# Patient Record
Sex: FEMALE | Race: BLACK OR AFRICAN AMERICAN | NOT HISPANIC OR LATINO | Employment: UNEMPLOYED | URBAN - METROPOLITAN AREA
[De-identification: names, ages, dates, MRNs, and addresses within clinical notes are randomized per-mention and may not be internally consistent; named-entity substitution may affect disease eponyms.]

---

## 2017-01-05 ENCOUNTER — OFFICE VISIT (OUTPATIENT)
Dept: OPHTHALMOLOGY | Facility: CLINIC | Age: 67
End: 2017-01-05

## 2017-01-05 DIAGNOSIS — H25.13 SENILE NUCLEAR SCLEROSIS, BILATERAL: Primary | ICD-10-CM

## 2017-01-05 DIAGNOSIS — H35.033 HYPERTENSIVE RETINOPATHY, BILATERAL: ICD-10-CM

## 2017-01-05 ASSESSMENT — VISUAL ACUITY
OD_SC+: -3
OS_SC: 20/20
OS_SC+: -1
METHOD: SNELLEN - LINEAR
OD_SC: 20/20

## 2017-01-05 ASSESSMENT — CUP TO DISC RATIO
OD_RATIO: 0.2
OS_RATIO: 0.2

## 2017-01-05 ASSESSMENT — CONF VISUAL FIELD
OD_NORMAL: 1
OS_NORMAL: 1

## 2017-01-05 ASSESSMENT — EXTERNAL EXAM - LEFT EYE: OS_EXAM: NORMAL

## 2017-01-05 ASSESSMENT — TONOMETRY
OS_IOP_MMHG: 16
OD_IOP_MMHG: 18
IOP_METHOD: ICARE

## 2017-01-05 ASSESSMENT — EXTERNAL EXAM - RIGHT EYE: OD_EXAM: NORMAL

## 2017-01-05 NOTE — MR AVS SNAPSHOT
After Visit Summary   2017    Caitlyn Langston    MRN: [id number]           Patient Information     Date Of Birth          1950        Visit Information        Provider Department      2017 10:40 AM Merlin Schwarz OD [facility]        Today's Diagnoses     Senile nuclear sclerosis, bilateral    -  1     Hypertensive retinopathy, bilateral            Follow-ups after your visit        Follow-up notes from your care team     Return in about 11 months (around 2017) for Annual Visit.      Who to contact     Please call your clinic at [phone] to:    Ask questions about your health    Make or cancel appointments    Discuss your medicines    Learn about your test results    Speak to your doctor   If you have compliments or concerns about an experience at your clinic, or if you wish to file a complaint, please contact [facility] Patient Relations at [phone] or email us at [email or web address]         Additional Information About Your Visit        MyChart Information     [employer] is an electronic gateway that provides easy, online access to your medical records. With [employer], you can request a clinic appointment, read your test results, renew a prescription or communicate with your care team.     To sign up for [employer] visit the website at [email or web address]   You will be asked to enter the access code listed below, as well as some personal information. Please follow the directions to create your username and password.     Your access code is: [email or web address]  Expires: 2017 11:29 AM     Your access code will  in 90 days. If you need help or a new code, please contact your [facility] or call [phone] for assistance.        Care EveryWhere ID     This is your Care EveryWhere ID. This could be used by other organizations to access your [facility]  records  [id number]         Blood Pressure from Last 3 Encounters:   No data found for BP    Weight from Last 3 Encounters:   No data found for Wt              Today, you had the following     No orders found for display       Primary Care Provider    None Specified       No primary provider on file.        Thank you!     Thank you for choosing [facility]  for your care. Our goal is always to provide you with excellent care. Hearing back from our patients is one way we can continue to improve our services. Please take a few minutes to complete the written survey that you may receive in the mail after your visit with us. Thank you!             Your Updated Medication List - Protect others around you: Learn how to safely use, store and throw away your medicines at www.disposemymeds.org.          This list is accurate as of: 1/5/17 11:29 AM.  Always use your most recent med list.                   Brand Name Dispense Instructions for use    LISINOPRIL PO          loratadine 10 MG ODT tab    CLARITIN REDITABS     Take 10 mg by mouth daily       VITAMIN D3 PO      Take by mouth daily

## 2017-01-05 NOTE — PROGRESS NOTES
Assessment/Plan  1. Nuclear sclerosis OU   Educated patient on condition. Not visually significant at this time. Continue to monitor annually.  2. Hypertensive retinopathy OU   Educated patient on clinical findings. Return to clinic in 1 year for dilated exam, or sooner, as needed.  3. Presbyopia OU   Not assessed at this visit. Monitor annually.

## 2018-01-04 ENCOUNTER — OFFICE VISIT (OUTPATIENT)
Dept: OPHTHALMOLOGY | Facility: CLINIC | Age: 68
End: 2018-01-04
Payer: COMMERCIAL

## 2018-01-04 DIAGNOSIS — H02.889 MEIBOMIAN GLAND DYSFUNCTION: ICD-10-CM

## 2018-01-04 DIAGNOSIS — H25.13 SENILE NUCLEAR SCLEROSIS, BILATERAL: ICD-10-CM

## 2018-01-04 DIAGNOSIS — H35.033 HYPERTENSIVE RETINOPATHY, BILATERAL: Primary | ICD-10-CM

## 2018-01-04 DIAGNOSIS — H52.4 PRESBYOPIA: ICD-10-CM

## 2018-01-04 DIAGNOSIS — D31.01 NEVUS OF RIGHT CONJUNCTIVA: ICD-10-CM

## 2018-01-04 ASSESSMENT — VISUAL ACUITY
OS_SC: 20/20
OD_SC+: -3
METHOD: SNELLEN - LINEAR
OS_SC+: -3
OD_SC: 20/20

## 2018-01-04 ASSESSMENT — TONOMETRY
IOP_METHOD: ICARE
OS_IOP_MMHG: 18
OD_IOP_MMHG: 19

## 2018-01-04 ASSESSMENT — CUP TO DISC RATIO
OS_RATIO: 0.2
OD_RATIO: 0.2

## 2018-01-04 ASSESSMENT — CONF VISUAL FIELD
OD_NORMAL: 1
OS_NORMAL: 1

## 2018-01-04 ASSESSMENT — EXTERNAL EXAM - LEFT EYE: OS_EXAM: NORMAL

## 2018-01-04 ASSESSMENT — EXTERNAL EXAM - RIGHT EYE: OD_EXAM: NORMAL

## 2018-01-04 NOTE — MR AVS SNAPSHOT
After Visit Summary   2018    Caitlyn Langston    MRN: [id number]           Patient Information     Date Of Birth          1950        Visit Information        Provider Department      2018 10:30 AM Merlin Schwarz OD [facility]        Today's Diagnoses     Hypertensive retinopathy, bilateral    -  1    Senile nuclear sclerosis, bilateral        Meibomian gland dysfunction        Nevus of right conjunctiva        Presbyopia           Follow-ups after your visit        Follow-up notes from your care team     Return in about 1 year (around 2019) for Comprehensive Eye Exam.      Who to contact     Please call your clinic at [phone] to:    Ask questions about your health    Make or cancel appointments    Discuss your medicines    Learn about your test results    Speak to your doctor   If you have compliments or concerns about an experience at your clinic, or if you wish to file a complaint, please contact [facility] Patient Relations at [phone] or email us at [email or web address]         Additional Information About Your Visit        MyChart Information     [employer] is an electronic gateway that provides easy, online access to your medical records. With [employer], you can request a clinic appointment, read your test results, renew a prescription or communicate with your care team.     To sign up for [employer] visit the website at [email or web address]   You will be asked to enter the access code listed below, as well as some personal information. Please follow the directions to create your username and password.     Your access code is: [email or web address]  Expires: 2018  6:31 AM     Your access code will  in 90 days. If you need help or a new code, please contact your [facility] or call [phone] for assistance.        Care EveryWhere ID     This is your Care EveryWhere  ID. This could be used by other organizations to access your [facility] medical records  [id number]         Blood Pressure from Last 3 Encounters:   No data found for BP    Weight from Last 3 Encounters:   No data found for Wt              Today, you had the following     No orders found for display       Primary Care Provider Office Phone # Fax #    Maritza Mary Alice Ramos [phone]       [facility] [address]  [facility] [address]        Equal Access to Services     [facility] : Hadii aad ku hadasho Soomaali, waaxda luqadaha, qaybta kaalmada adeegyada, waxay idiin hayaan adeeg kharash la'aan . So [facility] [phone].    ATENCIÓN: Si habla español, tiene a hooper disposición servicios gratuitos de asistencia lingüística. [facility] [phone].    We comply with applicable federal civil rights laws and [place] laws. We do not discriminate on the basis of race, color, national origin, age, disability, sex, sexual orientation, or gender identity.            Thank you!     Thank you for choosing [facility]  for your care. Our goal is always to provide you with excellent care. Hearing back from our patients is one way we can continue to improve our services. Please take a few minutes to complete the written survey that you may receive in the mail after your visit with us. Thank you!             Your Updated Medication List - Protect others around you: Learn how to safely use, store and throw away your medicines at www.disposemymeds.org.          This list is accurate as of: 1/4/18 11:50 AM.  Always use your most recent med list.                   Brand Name Dispense Instructions for use Diagnosis    LISINOPRIL PO           loratadine 10 MG ODT tab    CLARITIN REDITABS     Take 10 mg by mouth daily        VITAMIN D3 PO      Take by mouth daily

## 2018-01-04 NOTE — PROGRESS NOTES
Assessment/Plan  (H35.033) Hypertensive retinopathy, bilateral  (primary encounter diagnosis)  Comment: Mild crossing changes OU  Plan:  Educated patient on clinical findings and the importance of continued management with primary care physician. Continue management as directed and return to clinic in 1 year for dilated exam, or sooner, as needed.    (H25.13) Senile nuclear sclerosis, bilateral  Comment: Not visually significant  Plan:  Monitor annually.    (H02.89) Meibomian gland dysfunction  Comment: Asymptomatic  Plan:  Recommended artificial tears and warm compresses as needed.    (D31.01) Nevus of right conjunctiva  Comment: Stable  Plan:  Monitor.    (H52.4) Presbyopia  Comment: Not assessed at this visit  Plan:  Refraction as needed. Continue use of OTC readers    Return to clinic in 1 year for comprehensive eye exam.    Complete documentation of historical and exam elements from today's encounter can  be found in the full encounter summary report (not reduplicated in this progress  note). I personally obtained the chief complaint(s) and history of present illness. I  confirmed and edited as necessary the review of systems, past medical/surgical  history, family history, social history, and examination findings as documented by  others; and I examined the patient myself. I personally reviewed the relevant tests,  images, and reports as documented above. I formulated and edited as necessary the  assessment and plan and discussed the findings and management plan with the  patient and family.    Merlin Schwarz OD, [facility]

## 2019-01-09 ENCOUNTER — OFFICE VISIT (OUTPATIENT)
Dept: OPHTHALMOLOGY | Facility: CLINIC | Age: 69
End: 2019-01-09
Payer: COMMERCIAL

## 2019-01-09 DIAGNOSIS — D31.01 NEVUS OF RIGHT CONJUNCTIVA: ICD-10-CM

## 2019-01-09 DIAGNOSIS — H25.13 NUCLEAR SCLEROTIC CATARACT OF BOTH EYES: ICD-10-CM

## 2019-01-09 DIAGNOSIS — E11.9 TYPE 2 DIABETES MELLITUS WITHOUT OPHTHALMIC MANIFESTATIONS (H): Primary | ICD-10-CM

## 2019-01-09 RX ORDER — ATORVASTATIN CALCIUM 40 MG/1
40 TABLET, FILM COATED ORAL
COMMUNITY
Start: 2018-12-05

## 2019-01-09 RX ORDER — METFORMIN HCL 500 MG
1000 TABLET, EXTENDED RELEASE 24 HR ORAL
COMMUNITY
Start: 2018-10-03 | End: 2024-08-07

## 2019-01-09 RX ORDER — LISINOPRIL/HYDROCHLOROTHIAZIDE 10-12.5 MG
1 TABLET ORAL
COMMUNITY
Start: 2018-08-15 | End: 2024-08-07

## 2019-01-09 RX ORDER — ERGOCALCIFEROL 1.25 MG/1
50000 CAPSULE, LIQUID FILLED ORAL
COMMUNITY
Start: 2018-08-15 | End: 2024-08-07

## 2019-01-09 RX ORDER — ALLOPURINOL 100 MG/1
300 TABLET ORAL
COMMUNITY
Start: 2018-08-15

## 2019-01-09 RX ORDER — ASPIRIN 81 MG/1
81 TABLET ORAL
COMMUNITY
Start: 2014-04-15 | End: 2024-08-07

## 2019-01-09 ASSESSMENT — CUP TO DISC RATIO
OS_RATIO: 0.2
OD_RATIO: 0.2

## 2019-01-09 ASSESSMENT — VISUAL ACUITY
OD_SC: 20/25-2
METHOD: SNELLEN - LINEAR
OS_SC: 20/15-2

## 2019-01-09 ASSESSMENT — EXTERNAL EXAM - LEFT EYE: OS_EXAM: NORMAL

## 2019-01-09 ASSESSMENT — CONF VISUAL FIELD
OS_NORMAL: 1
OD_NORMAL: 1
METHOD: COUNTING FINGERS

## 2019-01-09 ASSESSMENT — SLIT LAMP EXAM - LIDS
COMMENTS: NORMAL
COMMENTS: NORMAL

## 2019-01-09 ASSESSMENT — REFRACTION_MANIFEST: OD_SPHERE: DECLINES

## 2019-01-09 ASSESSMENT — TONOMETRY
OD_IOP_MMHG: 10
OS_IOP_MMHG: 14
IOP_METHOD: TONOPEN

## 2019-01-09 ASSESSMENT — EXTERNAL EXAM - RIGHT EYE: OD_EXAM: NORMAL

## 2019-01-09 NOTE — PROGRESS NOTES
HPI  Caitlyn Langston is a 68 year old here for comprehensive eye exam for diabetes mellitus.  Has good vision both eyes with current glasses (uses for reading only, does not want distance refraction).  Denies eye pain or flashes/floaters.  Does not have any irritation or redness.  Diabetes has been well controlled, reports a hemoglobin a1c as at goal since being on metformin.     PMH: diabetes mellitus 2, hypertension, hyperlipidemia   POH: Glasses for presbyopia, cataracts, right conjunctival nevus since birth per patient, no surgery, no trauma  Oc Meds: none  FH: Denies any glaucoma, age related macular degeneration, or other known eye diseases, mother with cataract        Assessment & Plan        (E11.9) Type 2 diabetes mellitus without ophthalmic manifestations (H) - Both Eyes  (primary encounter diagnosis)  Comment: Diabetes Mellitus 2 w/o retinopathy   Unsure of A1C (patient reports it was satisfactory with primary care physician)  Plan:   Discussed the importance of tight blood glucose, blood pressure, and cholesterol  control in the prevention of diabetic retinopathy. Recommend yearly dilated eye exam. Letter to PCP sent.    (H25.13) Nuclear sclerotic cataract of both eyes - Both Eyes  Comment: early, not visually significant, counseled patient that it could cause mild glare/halos and refractive changes over time that can be compensated with new glasses   Plan: continue over the counter readers only per patient preference    (D31.01) Nevus of right conjunctiva - Right Eye  Comment: stable size per patient, no high-risk features    Plan: follow        -----------------------------------------------------------------------------------       Patient disposition:   Return in about 1 year (around 1/9/2020) for Comprehensive Exam- dm. and patient to call sooner as needed.      Complete documentation of historical and exam elements from today's encounter can be found in the full encounter summary report (not  reduplicated in this progress note). I personally obtained the chief complaint(s) and history of present illness.  I have confirmed and edited as necessary the CC, HPI, PMH/PSH, social history, FMH, ROS, and exam/neuro findings as obtained by the technician or others. I have examined this patient myself and I personally viewed the image(s) and studies listed above and the documentation reflects my findings and interpretation.       Azeb Hill

## 2019-01-09 NOTE — LETTER
Regarding: Caitlyn Langston    YOB: 1950    MRN: [id number]      Dear Dr. Ramos,     It was my pleasure to evaluate Caitlyn Langston on 1/9/2019 in our eye clinic. I have the last reported hemoglobin A1c as at goal, per the patient, but she wasn't sure of the number.    Pertinent exam findings today included:  Visual Acuity (Snellen - Linear)       Right Left Both    Dist sc 20/25-2 20/15-2 20/15-         Tonometry (Tonopen, 10:53 AM)       Right Left    Pressure 10 14         Main Ophthalmology Exam     External Exam       Right Left    External Normal Normal          Slit Lamp Exam       Right Left    Lids/Lashes Normal Normal    Conjunctiva/Sclera Nevus temporal to limbus 4x3mm  White and quiet    Cornea Clear Clear    Anterior Chamber Deep and quiet Deep and quiet    Iris Dilated Dilated    Lens 2+ Nuclear sclerosis 2+ Nuclear sclerosis    Vitreous Normal Normal          Fundus Exam       Right Left    Disc Normal Normal    C/D Ratio 0.2 0.2    Macula Normal Normal    Vessels trace AV nicking trace AV nicking    Periphery Normal- no DR Normal- no DR                There was no diabetic retinopathy seen on dilated exam today.      I have encouraged Georgia to continue working with you to maintain tight control of blood glucose and blood pressure.  Thank you for the opportunity to care for Georgia, and if you would like to discuss anything further, please do not hesitate to contact me.  I have asked her to return to clinic in about a year.          Best regards,          Azeb Hill MD        Comprehensive Ophthalmology          [facility]

## 2024-08-07 ENCOUNTER — OFFICE VISIT (OUTPATIENT)
Dept: VASCULAR SURGERY | Facility: CLINIC | Age: 74
End: 2024-08-07
Payer: COMMERCIAL

## 2024-08-07 DIAGNOSIS — I83.812 VARICOSE VEINS OF LEG WITH PAIN, LEFT: Primary | ICD-10-CM

## 2024-08-07 PROCEDURE — 99202 OFFICE O/P NEW SF 15 MIN: CPT | Performed by: SURGERY

## 2024-08-07 RX ORDER — LISINOPRIL 40 MG/1
1 TABLET ORAL DAILY
COMMUNITY
Start: 2023-09-29

## 2024-08-07 NOTE — PROGRESS NOTES
VEIN SOLUTIONS CONSULT    Impression:  1.  Symptomatic left leg varicosities with pain.    2.  History of nonocclusive DVT involving the left posterior tibial vein in 2017.    Plan:  I had a nice discussion with Georgia reviewing basic venous pathophysiology.  She has been utilizing a left leg compression garment I believe of 20-30 mmHg pressure for several weeks now yet she still complains of left leg pain, swelling, and discoloration.  I will arrange for a left leg venous competency study.  Ultimate treatment recommendations will be pending the results of that test.    Total length of this encounter was 20 minutes with time spent reviewing records, interviewing and examining the patient, answering questions, and coordinating a treatment plan.      HPI:   Caitlyn Langston is a pleasant 73-year-old female who is self referred to our clinic for evaluation of worsening left leg varicosities.  She complains of significant left leg pain with associated discoloration and swelling.  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  She has a history of 2 pregnancies.  She is unaware of a familial history of venous disease.  She has utilized a left leg compression garment of 20-30 mmHg pressure now for the past several weeks with little change in her symptoms.  Her past medical history is otherwise notable for a nonocclusive DVT involving her left posterior tibial vein which was diagnosed in 2017 and observed to be resolved on follow-up imaging.      CURRENT MEDICATIONS  Current Outpatient Medications   Medication Sig Dispense Refill    allopurinol (ZYLOPRIM) 100 MG tablet Take 300 mg by mouth      aspirin 81 MG EC tablet Take 81 mg by mouth      atorvastatin (LIPITOR) 40 MG tablet Take 40 mg by mouth      lisinopril-hydrochlorothiazide (PRINZIDE/ZESTORETIC) 10-12.5 MG tablet Take 1 tablet by mouth      loratadine (CLARITIN REDITABS) 10 MG  dispersible tablet Take 10 mg by mouth daily      metFORMIN (GLUCOPHAGE-XR) 500 MG 24 hr tablet Take 1,000 mg by mouth      vitamin D2 (ERGOCALCIFEROL) 96813 units (1250 mcg) capsule Take 50,000 Units by mouth       No current facility-administered medications for this visit.         PAST MEDICAL HISTORY  Past Medical History:   Diagnosis Date    Hypertension     Nonsenile cataract          PAST SURGICAL HISTORY:  Past Surgical History:   Procedure Laterality Date    NO HISTORY OF SURGERY         ALLERGIES   No Known Allergies    FAMILY HISTORY  Family History   Problem Relation Age of Onset    Hypertension Mother     Eye Surgery Mother     Hypertension Sister     Diabetes No family hx of     Glaucoma No family hx of     Macular Degeneration No family hx of     Cancer No family hx of     Cerebrovascular Disease No family hx of     Thyroid Disease No family hx of     Anesthesia Reaction No family hx of     Retinal detachment No family hx of     Amblyopia No family hx of     Strabismus No family hx of     Glasses (<7 y/o) No family hx of     Nystagmus No family hx of        SOCIAL HISTORY  Social History     Tobacco Use    Smoking status: Never    Smokeless tobacco: Never       ROS:   Review of Systems   Cardiovascular:  Positive for leg swelling.   All other systems reviewed and are negative.        EXAM:  There were no vitals taken for this visit.  Physical Exam  Constitutional:       Appearance: Normal appearance.   HENT:      Head: Normocephalic and atraumatic.   Eyes:      General: No scleral icterus.     Pupils: Pupils are equal, round, and reactive to light.   Cardiovascular:      Pulses: Normal pulses.      Comments: Prominent varicosity beginning on the posterior aspect of the distal left thigh and coursing posteriorly all the way down the left calf.  Musculoskeletal:         General: Swelling present. Normal range of motion.      Cervical back: Normal range of motion.   Skin:     General: Skin is warm and  "dry.   Neurological:      General: No focal deficit present.      Mental Status: She is alert and oriented to person, place, and time. Mental status is at baseline.   Psychiatric:         Mood and Affect: Mood normal.         Behavior: Behavior normal.         Thought Content: Thought content normal.         Judgment: Judgment normal.             Labs:  LIPID RESULTS:  No results found for: \"CHOL\", \"HDL\", \"LDL\", \"TRIG\", \"CHOLHDLRATIO\"    CBC RESULTS:  No results found for: \"WBC\", \"RBC\", \"HGB\", \"HCT\", \"MCV\", \"MCH\", \"MCHC\", \"RDW\", \"PLT\"    BMP RESULTS:  No results found for: \"NA\", \"POTASSIUM\", \"CHLORIDE\", \"CO2\", \"ANIONGAP\", \"GLC\", \"BUN\", \"CR\", \"GFRESTIMATED\", \"GFRESTBLACK\", \"LEENA\"     A1C RESULTS:  No results found for: \"A1C\"      Imaging:  I reviewed 2 left leg venous ultrasounds performed in 2017 showing nonocclusive thrombus of the left posterior tibial vein.  Subsequent ultrasound performed on 8/18/2023 showed resolution of the posterior tibial venous thrombus.      Gary Valverde MD      "

## 2024-08-07 NOTE — NURSING NOTE
Patient Reported symptoms:    Right leg   Heaviness None of the time   Achiness None of the time   Swelling None of the time   Throbbing None of the time   Itching None of the time   Appearance Slightly noticeable   Impact on work/activities Symptoms but full able to participate    Left Leg   Heaviness A little of the time   Achiness Some of the time   Swelling Some of the time   Throbbing None of the time   Itching None of the time   Appearance Very noticeable   Impact on work/activities Symptoms but full able to participate

## 2024-08-07 NOTE — LETTER
8/7/2024      Caitlyn Langston  [address] [address]      Dear Colleague,    Thank you for referring your patient, Caitlyn Langston, to the [facility] [place]. Please see a copy of my visit note below.    VEIN SOLUTIONS CONSULT    Impression: ***    Plan: ***        REASON FOR CONSULT:      HPI: ***    CURRENT MEDICATIONS  Current Outpatient Medications   Medication Sig Dispense Refill     allopurinol (ZYLOPRIM) 100 MG tablet Take 300 mg by mouth       aspirin 81 MG EC tablet Take 81 mg by mouth       atorvastatin (LIPITOR) 40 MG tablet Take 40 mg by mouth       lisinopril-hydrochlorothiazide (PRINZIDE/ZESTORETIC) 10-12.5 MG tablet Take 1 tablet by mouth       loratadine (CLARITIN REDITABS) 10 MG dispersible tablet Take 10 mg by mouth daily       metFORMIN (GLUCOPHAGE-XR) 500 MG 24 hr tablet Take 1,000 mg by mouth       vitamin D2 (ERGOCALCIFEROL) 61079 units (1250 mcg) capsule Take 50,000 Units by mouth       No current facility-administered medications for this visit.         PAST MEDICAL HISTORY  Past Medical History:   Diagnosis Date     Hypertension      Nonsenile cataract          PAST SURGICAL HISTORY:  Past Surgical History:   Procedure Laterality Date     NO HISTORY OF SURGERY         ALLERGIES   No Known Allergies    FAMILY HISTORY  Family History   Problem Relation Age of Onset     Hypertension Mother      Eye Surgery Mother      Hypertension Sister      Diabetes No family hx of      Glaucoma No family hx of      Macular Degeneration No family hx of      Cancer No family hx of      Cerebrovascular Disease No family hx of      Thyroid Disease No family hx of      Anesthesia Reaction No family hx of      Retinal detachment No family hx of      Amblyopia No family hx of      Strabismus No family hx of      Glasses (<7 y/o) No family hx of      Nystagmus No family hx of        SOCIAL HISTORY  Social History     Tobacco Use     Smoking status: Never     Smokeless  "tobacco: Never       ROS:   ROS      EXAM:  There were no vitals taken for this visit.  Physical Exam    PERIPHERAL ARTERIAL EXAMINATION:    CAROTID BRUIT RIGHT: No LEFT:  No   BRACHIAL   RIGHT:  { :304229} LEFT:  { :289331}   RADIAL    RIGHT:  { :738195} LEFT:  { :385131}   FEMORAL  RIGHT:  { :599990} LEFT: { :206647}   POPLITEAL RIGHT:  { :263260} LEFT:  { :030314}   PT RIGHT:  { :419290} LEFT:  { :453195}   DP RIGHT:  { :165939} LEFT:  { :036907}   PERFUSION  RIGHT:  Warm and perfused LEFT:  Warm and perfused   CAPILLARY REFILL  RIGHT: { :383394} LEFT:  { :594900}       PERIPHERAL ARTERIAL DOPPLER EXAMINATION    PT RIGHT:  {:967968::\"biphasic doppler auscultatable\"} LEFT: {:280930::\"biphasic doppler auscultatable\"}   DP RIGHT:  {:135441::\"biphasic doppler auscultatable\"} LEFT: {:110997::\"biphasic doppler auscultatable\"}       Labs:  LIPID RESULTS:  No results found for: \"CHOL\", \"HDL\", \"LDL\", \"TRIG\", \"CHOLHDLRATIO\"    CBC RESULTS:  No results found for: \"WBC\", \"RBC\", \"HGB\", \"HCT\", \"MCV\", \"MCH\", \"MCHC\", \"RDW\", \"PLT\"    BMP RESULTS:  No results found for: \"NA\", \"POTASSIUM\", \"CHLORIDE\", \"CO2\", \"ANIONGAP\", \"GLC\", \"BUN\", \"CR\", \"GFRESTIMATED\", \"GFRESTBLACK\", \"LEENA\"     A1C RESULTS:  No results found for: \"A1C\"      Imaging:  No results found for this or any previous visit (from the past 24 hour(s)).              Gary Valverde MD          Again, thank you for allowing me to participate in the care of your patient.        Sincerely,        Gary Valverde MD  "